# Patient Record
Sex: FEMALE | Race: WHITE | Employment: STUDENT | URBAN - METROPOLITAN AREA
[De-identification: names, ages, dates, MRNs, and addresses within clinical notes are randomized per-mention and may not be internally consistent; named-entity substitution may affect disease eponyms.]

---

## 2022-04-02 ENCOUNTER — HOSPITAL ENCOUNTER (EMERGENCY)
Age: 22
Discharge: HOME | End: 2022-04-02
Payer: COMMERCIAL

## 2022-04-02 ENCOUNTER — APPOINTMENT (OUTPATIENT)
Dept: GENERAL RADIOLOGY | Age: 22
End: 2022-04-02
Attending: NURSE PRACTITIONER
Payer: COMMERCIAL

## 2022-04-02 VITALS
HEIGHT: 62 IN | RESPIRATION RATE: 16 BRPM | BODY MASS INDEX: 39.56 KG/M2 | TEMPERATURE: 98 F | SYSTOLIC BLOOD PRESSURE: 152 MMHG | OXYGEN SATURATION: 98 % | DIASTOLIC BLOOD PRESSURE: 90 MMHG | WEIGHT: 215 LBS | HEART RATE: 95 BPM

## 2022-04-02 DIAGNOSIS — M25.511 RIGHT SHOULDER PAIN, UNSPECIFIED CHRONICITY: Primary | ICD-10-CM

## 2022-04-02 PROCEDURE — 99283 EMERGENCY DEPT VISIT LOW MDM: CPT

## 2022-04-02 PROCEDURE — 74011250637 HC RX REV CODE- 250/637: Performed by: NURSE PRACTITIONER

## 2022-04-02 PROCEDURE — 73030 X-RAY EXAM OF SHOULDER: CPT

## 2022-04-02 RX ORDER — ESCITALOPRAM OXALATE 10 MG/1
10 TABLET ORAL DAILY
COMMUNITY

## 2022-04-02 RX ORDER — ACETAMINOPHEN 325 MG/1
650 TABLET ORAL ONCE
Status: COMPLETED | OUTPATIENT
Start: 2022-04-02 | End: 2022-04-02

## 2022-04-02 RX ORDER — NORETHINDRONE ACETATE AND ETHINYL ESTRADIOL 1.5-30(21)
1 KIT ORAL DAILY
COMMUNITY

## 2022-04-02 RX ORDER — ATORVASTATIN CALCIUM 20 MG/1
TABLET, FILM COATED ORAL DAILY
COMMUNITY

## 2022-04-02 RX ORDER — IBUPROFEN 800 MG/1
800 TABLET ORAL ONCE
Status: COMPLETED | OUTPATIENT
Start: 2022-04-02 | End: 2022-04-02

## 2022-04-02 RX ADMIN — IBUPROFEN 800 MG: 800 TABLET, FILM COATED ORAL at 20:59

## 2022-04-02 RX ADMIN — ACETAMINOPHEN 650 MG: 325 TABLET ORAL at 20:59

## 2022-04-02 NOTE — LETTER
Wesley Lackey was seen and treated in our emergency department on 4/2/2022. She may return to school on 4/3/2022 with activity restriction of no heavy lifting, pushing, pulling, excessive use of right shoulder and arm throught 4/7/2022    If you have any questions or concerns, please don't hesitate to call.       Cally Garcia, [facility]

## 2022-04-03 NOTE — ED PROVIDER NOTES
EMERGENCY DEPARTMENT HISTORY AND PHYSICAL EXAM      Date: 4/2/2022  Patient Name: Alexandra Forbes    History of Presenting Illness     Chief Complaint   Patient presents with    Shoulder Pain       History Provided By: Patient and Patient's Mother    HPI: Alexandra Forbes, 24 y.o. female with a past medical history significant No significant past medical history presents to the ED with cc of right shoulder pain upon abrupt awakening with reported loud \"pop\" upon moving arm after sleeping on her right side. She is unaware of any malalignment and believes she twisted her shoulder upon awakening abruptly. No previous injury or surgery to her right shoulder. Neurovascularly intact upon arrival. There are no other complaints, changes, or physical findings at this time. PCP: No primary care provider on file. No current facility-administered medications on file prior to encounter. No current outpatient medications on file prior to encounter. Past History     Past Medical History:  History reviewed. No pertinent past medical history. Past Surgical History:  History reviewed. No pertinent surgical history. Family History:  History reviewed. No pertinent family history. Social History:  Social History     Tobacco Use    Smoking status: Never Smoker    Smokeless tobacco: Never Used   Substance Use Topics    Alcohol use: Never    Drug use: Never       Allergies:  No Known Allergies      Review of Systems     Review of Systems   Constitutional: Negative. HENT: Negative. Eyes: Negative. Respiratory: Negative. Negative for cough and shortness of breath. Cardiovascular: Negative. Gastrointestinal: Positive for vomiting. Vomited x 1 PTA   Genitourinary: Negative. Musculoskeletal: Positive for arthralgias. Negative for joint swelling, neck pain and neck stiffness. Skin: Negative. Neurological: Negative. Negative for tremors, weakness and numbness.    Psychiatric/Behavioral: Negative. All other systems reviewed and are negative. Physical Exam     Physical Exam  Vitals and nursing note reviewed. Constitutional:       General: She is not in acute distress. Appearance: Normal appearance. She is overweight. She is not ill-appearing, toxic-appearing or diaphoretic. HENT:      Head: Normocephalic. Eyes:      Conjunctiva/sclera: Conjunctivae normal.   Cardiovascular:      Rate and Rhythm: Normal rate. Pulses: Normal pulses. Pulmonary:      Effort: Pulmonary effort is normal. No respiratory distress. Abdominal:      Tenderness: There is no abdominal tenderness. Musculoskeletal:         General: Tenderness present. No swelling or deformity. Normal range of motion. Right shoulder: No crepitus. Normal strength. Normal pulse. Left shoulder: Normal.      Right elbow: Normal.      Left elbow: Normal.      Cervical back: Normal range of motion and neck supple. No rigidity or tenderness. Skin:     General: Skin is warm and dry. Capillary Refill: Capillary refill takes less than 2 seconds. Neurological:      General: No focal deficit present. Mental Status: She is alert and oriented to person, place, and time. Lab and Diagnostic Study Results     Labs -   No results found for this or any previous visit (from the past 12 hour(s)). Radiologic Studies -   @lastxrresult@  CT Results  (Last 48 hours)    None        CXR Results  (Last 48 hours)    None        XR SHOULDER RT AP/LAT MIN 2 V   Final Result   Negative bony examination of the right shoulder. Medical Decision Making   - I am the first provider for this patient. - I reviewed the vital signs, available nursing notes, past medical history, past surgical history, family history and social history. - Initial assessment performed. The patients presenting problems have been discussed, and they are in agreement with the care plan formulated and outlined with them.   I have encouraged them to ask questions as they arise throughout their visit. Vital Signs-Reviewed the patient's vital signs. Patient Vitals for the past 12 hrs:   Temp Pulse Resp BP SpO2   04/02/22 2017 98 °F (36.7 °C) 95 16 (!) 152/90 98 %       Records Reviewed: Nursing Notes and Old Medical Records    The patient presents with right shoulder pain with a differential diagnosis of fracture, dislocation, rotator cuff injury, tendonitis, ligament injury, frozen shoulder, DJD, osteoarthritis      ED Course: This patient presents with an shoulder pain. Neurovascularly intact. No gross shoulder instability. Imaging performed due to presentation and amount of initial pain with no gross abnormality, evidence of fracture or dislocation. Based on history and exam, consistent with an shoulder strain. Intact and equal bilateral upper extremity strength. Discussed follow up with PMD and given resources for ortho follow up as needed. Discussed strict return precautions for neurovascular insufficiency if pain not vastly improved in 5-7 days. Discussed conservative measures including rest, elevation, alternating application of ice, pain control and early ambulation as tolerated. I have instructed patient to take naproxen and ibuprofen as needed for pain. The patient is discharged home with conservative management and symptomatic treatment.  Distally neurovascularly intact       Provider Notes (Medical Decision Making):     MDM  Number of Diagnoses or Management Options  Right shoulder pain, unspecified chronicity: new, needed workup     Amount and/or Complexity of Data Reviewed  Tests in the radiology section of CPT®: ordered and reviewed  Discuss the patient with other providers: yes  Independent visualization of images, tracings, or specimens: yes    Risk of Complications, Morbidity, and/or Mortality  Presenting problems: low  Diagnostic procedures: minimal  Management options: minimal    Patient Progress  Patient progress: improved           Disposition   Disposition: Condition stable and improved  DC- Adult Discharges: All of the diagnostic tests were reviewed and questions answered. Diagnosis, care plan and treatment options were discussed. The patient understands the instructions and will follow up as directed. The patients results have been reviewed with them. They have been counseled regarding their diagnosis. The patient and parent verbally convey understanding and agreement of the signs, symptoms, diagnosis, treatment and prognosis and additionally agrees to follow up as recommended with their PCP in 24 - 48 hours. They also agree with the care-plan and convey that all of their questions have been answered. I have also put together some discharge instructions for them that include: 1) educational information regarding their diagnosis, 2) how to care for their diagnosis at home, as well a 3) list of reasons why they would want to return to the ED prior to their follow-up appointment, should their condition change. DISCHARGE PLAN:  1. There are no discharge medications for this patient. 2.   Follow-up Information     Follow up With Specialties Details Why Contact Phuc Euceda NP Nurse Practitioner In 3 days For follow up and reevaluation of shoulder [address] [facility]  [phone]      [address] [employer]  Call  As needed, If symptoms worsen and for continued right shoulder discomfort or return of pain [address] [facility]  [address]  [phone]    Follow up with primary care, urgent care, or this Emergency department   As needed, If symptoms worsen         3. Return to ED if worse   4. Discharge Medication List as of 4/2/2022  9:25 PM            Diagnosis     Clinical Impression:   1.  Right shoulder pain, unspecified chronicity        Attestations:    Kusum Olmos NP    Please note that this dictation was completed with Dragon, the computer voice recognition software. Quite often unanticipated grammatical, syntax, homophones, and other interpretive errors are inadvertently transcribed by the computer software. Please disregard these errors. Please excuse any errors that have escaped final proofreading. Thank you.

## 2022-04-03 NOTE — ED TRIAGE NOTES
Pt states she was awoken suddenly and when she woke up her mother heard a loud pop in shoulder and she threw up due to pain.  R shoulder